# Patient Record
Sex: FEMALE | Race: WHITE | Employment: PART TIME | URBAN - METROPOLITAN AREA
[De-identification: names, ages, dates, MRNs, and addresses within clinical notes are randomized per-mention and may not be internally consistent; named-entity substitution may affect disease eponyms.]

---

## 2018-08-22 LAB
C. TRACHOMATIS, EXTERNAL RESULT: NEGATIVE
N. GONORRHOEAE, EXTERNAL RESULT: NEGATIVE

## 2018-08-31 LAB
ABO, EXTERNAL RESULT: NORMAL
HEP B, EXTERNAL RESULT: NEGATIVE
HIV, EXTERNAL RESULT: NEGATIVE
RHOGAM, EXTERNAL RESULT: POSITIVE
RPR, EXTERNAL RESULT: NONREACTIVE
RUBELLA TITER, EXTERNAL RESULT: NORMAL

## 2018-11-21 ENCOUNTER — HOSPITAL ENCOUNTER (EMERGENCY)
Age: 33
Discharge: HOME | End: 2018-11-21
Payer: MEDICAID

## 2018-11-21 VITALS
TEMPERATURE: 98.2 F | BODY MASS INDEX: 26.4 KG/M2 | HEIGHT: 63 IN | SYSTOLIC BLOOD PRESSURE: 111 MMHG | DIASTOLIC BLOOD PRESSURE: 67 MMHG | RESPIRATION RATE: 16 BRPM | HEART RATE: 80 BPM | WEIGHT: 149 LBS | OXYGEN SATURATION: 100 %

## 2018-11-21 DIAGNOSIS — R05.9 COUGH: Primary | ICD-10-CM

## 2018-11-21 DIAGNOSIS — Z3A.22 22 WEEKS GESTATION OF PREGNANCY: ICD-10-CM

## 2018-11-21 DIAGNOSIS — F17.210 CIGARETTE SMOKER: ICD-10-CM

## 2018-11-21 PROCEDURE — 99283 EMERGENCY DEPT VISIT LOW MDM: CPT

## 2018-11-21 RX ORDER — AZITHROMYCIN 250 MG/1
250 TABLET, FILM COATED ORAL DAILY
Qty: 6 TABLET | Refills: 0 | Status: SHIPPED | OUTPATIENT
Start: 2018-11-21 | End: 2018-11-26

## 2019-01-14 ENCOUNTER — HOSPITAL ENCOUNTER (OUTPATIENT)
Dept: DIABETES SERVICES | Age: 34
Setting detail: THERAPIES SERIES
Discharge: HOME | End: 2019-01-14
Payer: MEDICAID

## 2019-01-14 VITALS — WEIGHT: 160 LBS | BODY MASS INDEX: 28.34 KG/M2

## 2019-01-14 DIAGNOSIS — O24.410 DIET CONTROLLED GESTATIONAL DIABETES MELLITUS (GDM) IN THIRD TRIMESTER: Primary | ICD-10-CM

## 2019-01-14 PROCEDURE — G0109 DIAB MANAGE TRN IND/GROUP: HCPCS | Performed by: DIETITIAN, REGISTERED

## 2019-01-14 ASSESSMENT — PATIENT HEALTH QUESTIONNAIRE - PHQ9
SUM OF ALL RESPONSES TO PHQ QUESTIONS 1-9: 2
SUM OF ALL RESPONSES TO PHQ QUESTIONS 1-9: 2

## 2019-01-31 ENCOUNTER — HOSPITAL ENCOUNTER (OUTPATIENT)
Dept: DIABETES SERVICES | Age: 34
Setting detail: THERAPIES SERIES
Discharge: HOME | End: 2019-01-31
Payer: MEDICAID

## 2019-01-31 DIAGNOSIS — O24.410 DIET CONTROLLED GESTATIONAL DIABETES MELLITUS (GDM) IN THIRD TRIMESTER: Primary | ICD-10-CM

## 2019-03-08 LAB — GBS, EXTERNAL RESULT: NEGATIVE

## 2019-04-05 ENCOUNTER — HOSPITAL ENCOUNTER (INPATIENT)
Age: 34
LOS: 4 days | Discharge: HOME | DRG: 540 | End: 2019-04-09
Attending: OBSTETRICS & GYNECOLOGY | Admitting: OBSTETRICS & GYNECOLOGY
Payer: MEDICAID

## 2019-04-05 ENCOUNTER — APPOINTMENT (OUTPATIENT)
Dept: LABOR AND DELIVERY | Age: 34
DRG: 540 | End: 2019-04-05
Payer: MEDICAID

## 2019-04-05 LAB
ABO/RH: NORMAL
AMPHETAMINE SCREEN, URINE: NORMAL
ANTIBODY SCREEN: NORMAL
BARBITURATE SCREEN URINE: NORMAL
BASOPHILS ABSOLUTE: 0 K/UL (ref 0–0.2)
BASOPHILS RELATIVE PERCENT: 0.2 %
BENZODIAZEPINE SCREEN, URINE: NORMAL
BUPRENORPHINE URINE: NORMAL
CANNABINOID SCREEN URINE: NORMAL
COCAINE METABOLITE SCREEN URINE: NORMAL
EOSINOPHILS ABSOLUTE: 0.2 K/UL (ref 0–0.6)
EOSINOPHILS RELATIVE PERCENT: 1.8 %
GLUCOSE BLD-MCNC: 83 MG/DL (ref 70–99)
HCT VFR BLD CALC: 35.1 % (ref 36–48)
HEMOGLOBIN: 12.2 G/DL (ref 12–16)
LYMPHOCYTES ABSOLUTE: 1.1 K/UL (ref 1–5.1)
LYMPHOCYTES RELATIVE PERCENT: 12.3 %
Lab: NORMAL
MCH RBC QN AUTO: 32.8 PG (ref 26–34)
MCHC RBC AUTO-ENTMCNC: 34.7 G/DL (ref 31–36)
MCV RBC AUTO: 94.6 FL (ref 80–100)
METHADONE SCREEN, URINE: NORMAL
MONOCYTES ABSOLUTE: 0.6 K/UL (ref 0–1.3)
MONOCYTES RELATIVE PERCENT: 6.4 %
NEUTROPHILS ABSOLUTE: 6.8 K/UL (ref 1.7–7.7)
NEUTROPHILS RELATIVE PERCENT: 79.3 %
OPIATE SCREEN URINE: NORMAL
OXYCODONE URINE: NORMAL
PDW BLD-RTO: 13.1 % (ref 12.4–15.4)
PERFORMED ON: NORMAL
PH UA: 6
PHENCYCLIDINE SCREEN URINE: NORMAL
PLATELET # BLD: 209 K/UL (ref 135–450)
PMV BLD AUTO: 8.9 FL (ref 5–10.5)
PROPOXYPHENE SCREEN: NORMAL
RBC # BLD: 3.71 M/UL (ref 4–5.2)
TOTAL SYPHILLIS IGG/IGM: NORMAL
WBC # BLD: 8.6 K/UL (ref 4–11)

## 2019-04-05 PROCEDURE — 6360000002 HC RX W HCPCS: Performed by: OBSTETRICS & GYNECOLOGY

## 2019-04-05 PROCEDURE — 1220000000 HC SEMI PRIVATE OB R&B

## 2019-04-05 PROCEDURE — 86850 RBC ANTIBODY SCREEN: CPT

## 2019-04-05 PROCEDURE — 86901 BLOOD TYPING SEROLOGIC RH(D): CPT

## 2019-04-05 PROCEDURE — 80307 DRUG TEST PRSMV CHEM ANLYZR: CPT

## 2019-04-05 PROCEDURE — 6370000000 HC RX 637 (ALT 250 FOR IP): Performed by: OBSTETRICS & GYNECOLOGY

## 2019-04-05 PROCEDURE — 86900 BLOOD TYPING SEROLOGIC ABO: CPT

## 2019-04-05 PROCEDURE — 86780 TREPONEMA PALLIDUM: CPT

## 2019-04-05 PROCEDURE — 85025 COMPLETE CBC W/AUTO DIFF WBC: CPT

## 2019-04-05 PROCEDURE — 2580000003 HC RX 258: Performed by: OBSTETRICS & GYNECOLOGY

## 2019-04-05 RX ORDER — ONDANSETRON 2 MG/ML
4 INJECTION INTRAMUSCULAR; INTRAVENOUS EVERY 6 HOURS PRN
Status: DISCONTINUED | OUTPATIENT
Start: 2019-04-05 | End: 2019-04-07

## 2019-04-05 RX ORDER — SODIUM CHLORIDE 0.9 % (FLUSH) 0.9 %
10 SYRINGE (ML) INJECTION EVERY 12 HOURS SCHEDULED
Status: DISCONTINUED | OUTPATIENT
Start: 2019-04-05 | End: 2019-04-07

## 2019-04-05 RX ORDER — ACYCLOVIR 800 MG/1
800 TABLET ORAL 2 TIMES DAILY
Status: ON HOLD | COMMUNITY
End: 2019-04-09 | Stop reason: HOSPADM

## 2019-04-05 RX ORDER — SODIUM CHLORIDE 0.9 % (FLUSH) 0.9 %
10 SYRINGE (ML) INJECTION PRN
Status: DISCONTINUED | OUTPATIENT
Start: 2019-04-05 | End: 2019-04-07

## 2019-04-05 RX ORDER — SODIUM CHLORIDE, SODIUM LACTATE, POTASSIUM CHLORIDE, CALCIUM CHLORIDE 600; 310; 30; 20 MG/100ML; MG/100ML; MG/100ML; MG/100ML
INJECTION, SOLUTION INTRAVENOUS CONTINUOUS
Status: DISCONTINUED | OUTPATIENT
Start: 2019-04-05 | End: 2019-04-07

## 2019-04-05 RX ADMIN — DINOPROSTONE 10 MG: 10 INSERT VAGINAL at 09:10

## 2019-04-05 RX ADMIN — Medication 1 MILLI-UNITS/MIN: at 23:10

## 2019-04-05 RX ADMIN — SODIUM CHLORIDE, POTASSIUM CHLORIDE, SODIUM LACTATE AND CALCIUM CHLORIDE: 600; 310; 30; 20 INJECTION, SOLUTION INTRAVENOUS at 08:00

## 2019-04-05 RX ADMIN — SODIUM CHLORIDE, POTASSIUM CHLORIDE, SODIUM LACTATE AND CALCIUM CHLORIDE: 600; 310; 30; 20 INJECTION, SOLUTION INTRAVENOUS at 22:15

## 2019-04-05 ASSESSMENT — PAIN - FUNCTIONAL ASSESSMENT: PAIN_FUNCTIONAL_ASSESSMENT: 0-10

## 2019-04-05 NOTE — H&P
Department of Obstetrics and Gynecology   Obstetrics History and Physical        CHIEF COMPLAINT: IOL     HISTORY OF PRESENT ILLNESS:      The patient is a 29 y.o. female at 36w2d. OB History        3    Para   0    Term   0            AB   2    Living           SAB        TAB        Ectopic        Molar        Multiple        Live Births                Patient presents with a chief complaint as above and is being admitted for IOL 2/ to gDM- diet     Blood sugars controlled with diet. QS neg this pregnancy   Hx HSV on suppression- no current outbreak/sx   GBS neg   1 hr - abn 3 hr      Estimated Due Date: Estimated Date of Delivery: 19    PAST OB HISTORY:  OB History        3    Para   0    Term   0            AB   2    Living           SAB        TAB        Ectopic        Molar        Multiple        Live Births                    Past Medical History:        Diagnosis Date    Chicken pox     Diabetes mellitus ([facility] [place] [address]     Herpes simplex virus (HSV) infection     Pneumonia, aspiration ([facility] [place] [address]     used an inhalant and aspirated it    Tobacco use disorder 2012     Past Surgical History:        Procedure Laterality Date    DILATION AND CURETTAGE OF UTERUS       Allergies:  Patient has no known allergies.     Social History:    Social History     Socioeconomic History    Marital status: Single     Spouse name: Not on file    Number of children: Not on file    Years of education: Not on file    Highest education level: Not on file   Occupational History    Not on file   Social Needs    Financial resource strain: Not on file    Food insecurity:     Worry: Not on file     Inability: Not on file    Transportation needs:     Medical: Not on file     Non-medical: Not on file   Tobacco Use    Smoking status: Current Every Day Smoker     Packs/day: 0.50     Years: 0.00     Pack years: 0.00     Last attempt to quit: 2011     Years since quittin.4    Smokeless tobacco: Never Used    Tobacco comment: maybe 3 cigs per week   Substance and Sexual Activity    Alcohol use: Yes     Comment: 1 glass red wine per week    Drug use: Not Currently     Types: Marijuana     Comment: hx of marijuana use during pregnancy    Sexual activity: Yes     Partners: Male   Lifestyle    Physical activity:     Days per week: Not on file     Minutes per session: Not on file    Stress: Not on file   Relationships    Social connections:     Talks on phone: Not on file     Gets together: Not on file     Attends Congregational service: Not on file     Active member of club or organization: Not on file     Attends meetings of clubs or organizations: Not on file     Relationship status: Not on file    Intimate partner violence:     Fear of current or ex partner: Not on file     Emotionally abused: Not on file     Physically abused: Not on file     Forced sexual activity: Not on file   Other Topics Concern    Not on file   Social History Narrative    Not on file     Family History:       Problem Relation Age of Onset    Mental Illness Father     Substance Abuse Father     Diabetes Other      Medications Prior to Admission:  Medications Prior to Admission: acyclovir (ZOVIRAX) 800 MG tablet, Take 800 mg by mouth 2 times daily  Prenatal MV-Min-Fe Fum-FA-DHA (PRENATAL 1 PO), Take by mouth      PHYSICAL EXAM:  Vitals:    04/05/19 0757   BP: 131/78   Pulse: 107   Resp: 16   Temp: 98.3 °F (36.8 °C)   TempSrc: Oral     General appearance:  awake, alert, cooperative, no apparent distress, and appears stated age  Neurologic:  Awake, alert, oriented to name, place and time. Lungs:  No increased work of breathing, good air exchange  Abdomen:  Soft, non tender, gravid, consistent with her gestational age, EFW by Leopald's manouever was 8#  Fetal heart rate:  Reassuring.   Pelvis:  Adequate pelvis  Cervix: 1-2/60/-2 , can stretch to 2cm  Contraction frequency:  none      Labs:   CBC:   Lab Results Component Value Date    WBC 8.6 04/05/2019    RBC 3.71 04/05/2019    HGB 12.2 04/05/2019    HCT 35.1 04/05/2019    MCV 94.6 04/05/2019    MCH 32.8 04/05/2019    MCHC 34.7 04/05/2019    RDW 13.1 04/05/2019     04/05/2019    MPV 8.9 04/05/2019       ASSESSMENT AND PLAN:    Labor: Admit, anticipate normal delivery, routine labor orders  Fetus: Reassuring  GBS: No  Other: discussed IOL, discussed cervical exam and cervical ripening, will proceed with cervidil.  Check blood sugar on admission    Damien Rosales MD

## 2019-04-05 NOTE — PROGRESS NOTES
Structured report received. Pt awake, alert and oriented x4 resting with semi-fowlers with pillow tilt. Pt states positive FM. Denies HA, blurred vision and epigastric pain. Denies vaginal bleeding or LOF. States intermittent ctx pain referring to ctxs as \"mild period cramping\" but denies pain. Pt reports no pain associated with IV at this time. Updated on POC. Verbalized understanding. All questions answered at this time. Will continue to monitor and assess.

## 2019-04-06 ENCOUNTER — ANESTHESIA (OUTPATIENT)
Dept: LABOR AND DELIVERY | Age: 34
DRG: 540 | End: 2019-04-06
Payer: MEDICAID

## 2019-04-06 ENCOUNTER — ANESTHESIA EVENT (OUTPATIENT)
Dept: LABOR AND DELIVERY | Age: 34
DRG: 540 | End: 2019-04-06
Payer: MEDICAID

## 2019-04-06 VITALS — SYSTOLIC BLOOD PRESSURE: 70 MMHG | DIASTOLIC BLOOD PRESSURE: 42 MMHG | OXYGEN SATURATION: 94 %

## 2019-04-06 PROCEDURE — 7100000001 HC PACU RECOVERY - ADDTL 15 MIN: Performed by: OBSTETRICS & GYNECOLOGY

## 2019-04-06 PROCEDURE — 3700000025 EPIDURAL BLOCK: Performed by: ANESTHESIOLOGY

## 2019-04-06 PROCEDURE — 3700000000 HC ANESTHESIA ATTENDED CARE: Performed by: OBSTETRICS & GYNECOLOGY

## 2019-04-06 PROCEDURE — 2709999900 HC NON-CHARGEABLE SUPPLY: Performed by: OBSTETRICS & GYNECOLOGY

## 2019-04-06 PROCEDURE — 2500000003 HC RX 250 WO HCPCS: Performed by: NURSE ANESTHETIST, CERTIFIED REGISTERED

## 2019-04-06 PROCEDURE — 1220000000 HC SEMI PRIVATE OB R&B

## 2019-04-06 PROCEDURE — 6360000002 HC RX W HCPCS: Performed by: NURSE ANESTHETIST, CERTIFIED REGISTERED

## 2019-04-06 PROCEDURE — 51701 INSERT BLADDER CATHETER: CPT

## 2019-04-06 PROCEDURE — 7100000000 HC PACU RECOVERY - FIRST 15 MIN: Performed by: OBSTETRICS & GYNECOLOGY

## 2019-04-06 PROCEDURE — 3609079900 HC CESAREAN SECTION: Performed by: OBSTETRICS & GYNECOLOGY

## 2019-04-06 PROCEDURE — 2580000003 HC RX 258: Performed by: OBSTETRICS & GYNECOLOGY

## 2019-04-06 PROCEDURE — 3700000001 HC ADD 15 MINUTES (ANESTHESIA): Performed by: OBSTETRICS & GYNECOLOGY

## 2019-04-06 PROCEDURE — 51702 INSERT TEMP BLADDER CATH: CPT

## 2019-04-06 PROCEDURE — 6360000002 HC RX W HCPCS: Performed by: OBSTETRICS & GYNECOLOGY

## 2019-04-06 RX ORDER — MORPHINE SULFATE 0.5 MG/ML
INJECTION, SOLUTION EPIDURAL; INTRATHECAL; INTRAVENOUS
Status: DISCONTINUED
Start: 2019-04-06 | End: 2019-04-07

## 2019-04-06 RX ORDER — FENTANYL CITRATE 50 UG/ML
INJECTION, SOLUTION INTRAMUSCULAR; INTRAVENOUS PRN
Status: DISCONTINUED | OUTPATIENT
Start: 2019-04-06 | End: 2019-04-07 | Stop reason: SDUPTHER

## 2019-04-06 RX ORDER — BUPIVACAINE HYDROCHLORIDE 5 MG/ML
INJECTION, SOLUTION EPIDURAL; INTRACAUDAL PRN
Status: DISCONTINUED | OUTPATIENT
Start: 2019-04-06 | End: 2019-04-07 | Stop reason: SDUPTHER

## 2019-04-06 RX ORDER — LIDOCAINE HYDROCHLORIDE 20 MG/ML
INJECTION, SOLUTION EPIDURAL; INFILTRATION; INTRACAUDAL; PERINEURAL PRN
Status: DISCONTINUED | OUTPATIENT
Start: 2019-04-06 | End: 2019-04-07 | Stop reason: SDUPTHER

## 2019-04-06 RX ORDER — ONDANSETRON 2 MG/ML
INJECTION INTRAMUSCULAR; INTRAVENOUS
Status: COMPLETED
Start: 2019-04-06 | End: 2019-04-06

## 2019-04-06 RX ORDER — MORPHINE SULFATE 10 MG/ML
INJECTION, SOLUTION INTRAMUSCULAR; INTRAVENOUS PRN
Status: DISCONTINUED | OUTPATIENT
Start: 2019-04-06 | End: 2019-04-07 | Stop reason: SDUPTHER

## 2019-04-06 RX ORDER — MIDAZOLAM HYDROCHLORIDE 1 MG/ML
INJECTION INTRAMUSCULAR; INTRAVENOUS PRN
Status: DISCONTINUED | OUTPATIENT
Start: 2019-04-06 | End: 2019-04-07 | Stop reason: SDUPTHER

## 2019-04-06 RX ORDER — ONDANSETRON 2 MG/ML
INJECTION INTRAMUSCULAR; INTRAVENOUS PRN
Status: DISCONTINUED | OUTPATIENT
Start: 2019-04-06 | End: 2019-04-07 | Stop reason: SDUPTHER

## 2019-04-06 RX ADMIN — SODIUM CHLORIDE, POTASSIUM CHLORIDE, SODIUM LACTATE AND CALCIUM CHLORIDE: 600; 310; 30; 20 INJECTION, SOLUTION INTRAVENOUS at 06:45

## 2019-04-06 RX ADMIN — PHENYLEPHRINE HYDROCHLORIDE 100 MCG: 10 INJECTION INTRAVENOUS at 23:01

## 2019-04-06 RX ADMIN — LIDOCAINE HYDROCHLORIDE 5 ML: 20 INJECTION, SOLUTION EPIDURAL; INFILTRATION; INTRACAUDAL; PERINEURAL at 22:19

## 2019-04-06 RX ADMIN — AZITHROMYCIN MONOHYDRATE 500 MG: 500 INJECTION, POWDER, LYOPHILIZED, FOR SOLUTION INTRAVENOUS at 22:30

## 2019-04-06 RX ADMIN — BUPIVACAINE HYDROCHLORIDE 0.8 ML: 5 INJECTION, SOLUTION EPIDURAL; INTRACAUDAL; PERINEURAL at 02:08

## 2019-04-06 RX ADMIN — PHENYLEPHRINE HYDROCHLORIDE 200 MCG: 10 INJECTION INTRAVENOUS at 23:12

## 2019-04-06 RX ADMIN — FAMOTIDINE 20 MG: 10 INJECTION, SOLUTION INTRAVENOUS at 22:19

## 2019-04-06 RX ADMIN — LIDOCAINE HYDROCHLORIDE 5 ML: 20 INJECTION, SOLUTION EPIDURAL; INFILTRATION; INTRACAUDAL; PERINEURAL at 22:21

## 2019-04-06 RX ADMIN — Medication 15 ML/HR: at 02:17

## 2019-04-06 RX ADMIN — ONDANSETRON 4 MG: 2 INJECTION INTRAMUSCULAR; INTRAVENOUS at 22:18

## 2019-04-06 RX ADMIN — MORPHINE SULFATE 3 MG: 10 INJECTION, SOLUTION INTRAMUSCULAR; INTRAVENOUS at 22:50

## 2019-04-06 RX ADMIN — PHENYLEPHRINE HYDROCHLORIDE 100 MCG: 10 INJECTION INTRAVENOUS at 23:06

## 2019-04-06 RX ADMIN — MIDAZOLAM HYDROCHLORIDE 2 MG: 2 INJECTION, SOLUTION INTRAMUSCULAR; INTRAVENOUS at 22:45

## 2019-04-06 RX ADMIN — LIDOCAINE HYDROCHLORIDE 5 ML: 20 INJECTION, SOLUTION EPIDURAL; INFILTRATION; INTRACAUDAL; PERINEURAL at 22:17

## 2019-04-06 RX ADMIN — PHENYLEPHRINE HYDROCHLORIDE 100 MCG: 10 INJECTION INTRAVENOUS at 22:58

## 2019-04-06 RX ADMIN — MORPHINE SULFATE 2 MG: 10 INJECTION, SOLUTION INTRAMUSCULAR; INTRAVENOUS at 23:10

## 2019-04-06 RX ADMIN — FENTANYL CITRATE 100 MCG: 50 INJECTION INTRAMUSCULAR; INTRAVENOUS at 22:44

## 2019-04-06 RX ADMIN — SODIUM CHLORIDE, POTASSIUM CHLORIDE, SODIUM LACTATE AND CALCIUM CHLORIDE: 600; 310; 30; 20 INJECTION, SOLUTION INTRAVENOUS at 15:19

## 2019-04-06 RX ADMIN — LIDOCAINE HYDROCHLORIDE 5 ML: 20 INJECTION, SOLUTION EPIDURAL; INFILTRATION; INTRACAUDAL; PERINEURAL at 22:27

## 2019-04-06 RX ADMIN — FENTANYL CITRATE 100 MCG: 50 INJECTION INTRAMUSCULAR; INTRAVENOUS at 22:47

## 2019-04-06 RX ADMIN — Medication 2 G: at 22:28

## 2019-04-06 ASSESSMENT — PULMONARY FUNCTION TESTS
PIF_VALUE: 0

## 2019-04-06 ASSESSMENT — LIFESTYLE VARIABLES: SMOKING_STATUS: 1

## 2019-04-06 NOTE — PLAN OF CARE
Problem: Anxiety:  Goal: Level of anxiety will decrease  Description  Level of anxiety will decrease  Outcome: Ongoing     Problem: Breathing Pattern - Ineffective:  Goal: Able to breathe comfortably  Description  Able to breathe comfortably  Outcome: Ongoing     Problem: Fluid Volume - Imbalance:  Goal: Absence of imbalanced fluid volume signs and symptoms  Description  Absence of imbalanced fluid volume signs and symptoms  Outcome: Ongoing  Goal: Absence of intrapartum hemorrhage signs and symptoms  Description  Absence of intrapartum hemorrhage signs and symptoms  Outcome: Ongoing     Problem: Infection - Intrapartum Infection:  Goal: Will show no infection signs and symptoms  Description  Will show no infection signs and symptoms  Outcome: Ongoing     Problem: Labor Process - Prolonged:  Goal: Labor progression, first stage, within specified pattern  Description  Labor progression, first stage, within specified pattern  Outcome: Ongoing  Goal: Labor progession, second stage, within specified pattern  Description  Labor progession, second stage, within specified pattern  Outcome: Ongoing  Goal: Uterine contractions within specified parameters  Description  Uterine contractions within specified parameters  Outcome: Ongoing     Problem: Pain - Acute:  Goal: Pain level will decrease  Description  Pain level will decrease  Outcome: Ongoing  Goal: Able to cope with pain  Description  Able to cope with pain  Outcome: Ongoing     Problem: Tissue Perfusion - Uteroplacental, Altered:  Goal: Absence of abnormal fetal heart rate pattern  Description  Absence of abnormal fetal heart rate pattern  Outcome: Ongoing     Problem: Urinary Retention:  Goal: Experiences of bladder distention will decrease  Description  Experiences of bladder distention will decrease  Outcome: Ongoing  Goal: Urinary elimination within specified parameters  Description  Urinary elimination within specified parameters  Outcome: Ongoing     Problem: Pain:  Goal: Pain level will decrease  Description  Pain level will decrease  Outcome: Ongoing  Goal: Control of acute pain  Description  Control of acute pain  Outcome: Ongoing  Goal: Control of chronic pain  Description  Control of chronic pain  Outcome: Ongoing

## 2019-04-06 NOTE — ANESTHESIA PRE PROCEDURE
Department of Anesthesiology  Preprocedure Note       Name:  Alicia Lira   Age:  29 y.o.  :  1985                                          MRN:  [id number]         Date:  2019      Surgeon: * No surgeons listed *    Procedure: ANESTHESIA LABOR ANALGESIA    Medications prior to admission:   Prior to Admission medications    Medication Sig Start Date End Date Taking?  Authorizing Provider   acyclovir (ZOVIRAX) 800 MG tablet Take 800 mg by mouth 2 times daily   Yes Historical Provider, MD   Prenatal MV-Min-Fe Fum-FA-DHA (PRENATAL 1 PO) Take by mouth   Yes Historical Provider, MD       Current medications:    Current Facility-Administered Medications   Medication Dose Route Frequency Provider Last Rate Last Dose    sodium chloride 0.9 % 200 mL with fentaNYL 500 mcg, bupivacaine 0.5% 50 mL (OB) epidural             lactated ringers infusion   Intravenous Continuous Arpita Brantley  mL/hr at 19      sodium chloride flush 0.9 % injection 10 mL  10 mL Intravenous 2 times per day Arpita Brantley MD        sodium chloride flush 0.9 % injection 10 mL  10 mL Intravenous PRN Arpita Brantley MD        ondansetron [facility]) injection 4 mg  4 mg Intravenous Q6H PRN Arpita Brantley MD        oxytocin (PITOCIN) 30 units in 500 mL infusion  1 milo-units/min Intravenous Continuous Arpita Brantley MD 6 mL/hr at 19 0100 6 milo-units/min at 19 0100       Allergies:  No Known Allergies    Problem List:    Patient Active Problem List   Diagnosis Code    Tobacco use disorder F17.200       Past Medical History:        Diagnosis Date    Chicken pox     [facility] ([facility] [address]     Herpes simplex virus (HSV) infection     Pneumonia, aspiration ([facility] [address]     used an inhalant and aspirated it    Tobacco use disorder 2012       Past Surgical History:        Procedure Laterality Date    DILATION AND CURETTAGE OF UTERUS         Social History:    Social History     Tobacco Use    Smoking status: Current Every Day Smoker     Packs/day: 0.50     Years: 0.00     Pack years: 0.00     Last attempt to quit: 2011     Years since quittin.4    Smokeless tobacco: Never Used    Tobacco comment: maybe 3 cigs per week   Substance Use Topics    Alcohol use: Yes     Comment: 1 glass red wine per week                                Ready to quit: Yes  Counseling given: No  Comment: maybe 3 cigs per week      Vital Signs (Current):   Vitals:    19 0000 19 0030 19 0100 19 0138   BP: 138/74 117/64 (!) 109/57 (!) 145/74   Pulse: 77 83 77 74   Resp: 16 16 16    Temp: 37 °C (98.6 °F)      TempSrc: Oral      Weight:       Height:                                                  BP Readings from Last 3 Encounters:   19 (!) 145/74   18 111/67   12 108/60       NPO Status: Time of last liquid consumption: 730                        Time of last solid consumption: 730                        Date of last liquid consumption: 19                        Date of last solid food consumption: 19    BMI:   Wt Readings from Last 3 Encounters:   19 177 lb (80.3 kg)   19 160 lb (72.6 kg)   18 149 lb (67.6 kg)     Body mass index is 31.35 kg/m².     CBC:   Lab Results   Component Value Date    WBC 8.6 2019    RBC 3.71 2019    HGB 12.2 2019    HCT 35.1 2019    MCV 94.6 2019    RDW 13.1 2019     2019       CMP: No results found for: NA, K, CL, CO2, BUN, CREATININE, GFRAA, AGRATIO, LABGLOM, GLUCOSE, PROT, CALCIUM, BILITOT, ALKPHOS, AST, ALT    POC Tests:   Recent Labs     19  0849   POCGLU 83       Coags: No results found for: PROTIME, INR, APTT    HCG (If Applicable): No results found for: PREGTESTUR, PREGSERUM, HCG, HCGQUANT     ABGs: No results found for: PHART, PO2ART, BUN6RIH, JAZ0OPS, BEART, L6FBVNMN     Type & Screen (If Applicable):  No results found for: LABABO, [address]    Anesthesia Evaluation  Patient summary reviewed and Nursing notes reviewed no history of anesthetic complications:   Airway: Mallampati: II        Dental:          Pulmonary:   (+) current smoker                           Cardiovascular:Negative CV ROS                      Neuro/Psych:   Negative Neuro/Psych ROS              GI/Hepatic/Renal: Neg GI/Hepatic/Renal ROS            Endo/Other:    (+) Diabetes (gest), . Abdominal:           Vascular: negative vascular ROS. Anesthesia Plan      epidural     ASA 2     (Benefits and risks of CSE were discussed and agreed upon. All questions were answered, and verbal consent was obtained.)        Anesthetic plan and risks discussed with patient.                       ISHAN Hernandez - CRNA   4/6/2019

## 2019-04-06 NOTE — PROGRESS NOTES
Dr. Jeff Ortega in department. Updated on pt status. Epidural received approximately 0200.  0502 pitocin halfed r/t late FHR decelerations present on monitor. Pt repositioned, IVF fluid bolus given. SVE results at 0600 5/70/-2 s/p straight catheterization. Pitocin at 5 mu/min at this time. Ctxs occurring q2-4min and pt resting comfortably on far left side. No orders given at this time. Will continue to monitor and assess.

## 2019-04-06 NOTE — PROGRESS NOTES
Dr. Brian To at bedside. FHT/ctx pattern reviewed. Plan of care discussed. Will continue to monitor.

## 2019-04-06 NOTE — PLAN OF CARE
Problem: Anxiety:  Goal: Level of anxiety will decrease  Description  Level of anxiety will decrease  4/6/2019 0941 by Gauri Amaya RN  Outcome: Ongoing  4/6/2019 0714 by Suzy Villanueva RN  Outcome: Ongoing     Problem: Breathing Pattern - Ineffective:  Goal: Able to breathe comfortably  Description  Able to breathe comfortably  4/6/2019 0941 by Gauri Amaya RN  Outcome: Ongoing  4/6/2019 0714 by Suzy Villanueva RN  Outcome: Ongoing     Problem: Fluid Volume - Imbalance:  Goal: Absence of imbalanced fluid volume signs and symptoms  Description  Absence of imbalanced fluid volume signs and symptoms  4/6/2019 0941 by Gauri Amaya RN  Outcome: Ongoing  4/6/2019 0714 by Suzy Villanueva RN  Outcome: Ongoing  Goal: Absence of intrapartum hemorrhage signs and symptoms  Description  Absence of intrapartum hemorrhage signs and symptoms  4/6/2019 0941 by Gauri Amaya RN  Outcome: Ongoing  4/6/2019 0714 by Suzy Villanueva RN  Outcome: Ongoing     Problem: Infection - Intrapartum Infection:  Goal: Will show no infection signs and symptoms  Description  Will show no infection signs and symptoms  4/6/2019 0941 by Gauri Amaya RN  Outcome: Ongoing  4/6/2019 0714 by Suzy Villanueva RN  Outcome: Ongoing     Problem: Labor Process - Prolonged:  Goal: Labor progression, first stage, within specified pattern  Description  Labor progression, first stage, within specified pattern  4/6/2019 0941 by Gauri Amaya RN  Outcome: Ongoing  4/6/2019 0714 by Suzy Villanueva RN  Outcome: Ongoing  Goal: Labor progession, second stage, within specified pattern  Description  Labor progession, second stage, within specified pattern  4/6/2019 0714 by Suzy Villanueva RN  Outcome: Ongoing  Goal: Uterine contractions within specified parameters  Description  Uterine contractions within specified parameters  4/6/2019 0714 by Suzy Villanueva RN  Outcome: Ongoing     Problem: Pain - Acute:  Goal: Pain level will decrease  Description  Pain level will decrease  4/6/2019 0941 by Jose Sheridan RN  Outcome: Ongoing  4/6/2019 0714 by Anette Calhoun RN  Outcome: Ongoing  Goal: Able to cope with pain  Description  Able to cope with pain  4/6/2019 0714 by Anette Calhoun RN  Outcome: Ongoing     Problem: Tissue Perfusion - Uteroplacental, Altered:  Goal: Absence of abnormal fetal heart rate pattern  Description  Absence of abnormal fetal heart rate pattern  4/6/2019 0941 by Jose Sheridan RN  Outcome: Ongoing  4/6/2019 0714 by Anette Calhoun RN  Outcome: Ongoing     Problem: Urinary Retention:  Goal: Experiences of bladder distention will decrease  Description  Experiences of bladder distention will decrease  4/6/2019 0714 by Anette Calhoun RN  Outcome: Ongoing  Goal: Urinary elimination within specified parameters  Description  Urinary elimination within specified parameters  4/6/2019 0941 by Jose Sheridan RN  Outcome: Ongoing  4/6/2019 0714 by Anette Calhoun RN  Outcome: Ongoing     Problem: Pain:  Goal: Pain level will decrease  Description  Pain level will decrease  4/6/2019 0941 by Jose Sheridan RN  Outcome: Ongoing  4/6/2019 0714 by Anette Calhoun RN  Outcome: Ongoing  Goal: Control of acute pain  Description  Control of acute pain  4/6/2019 0714 by Anette Calhoun RN  Outcome: Ongoing  Goal: Control of chronic pain  Description  Control of chronic pain  4/6/2019 0714 by Anette Calhoun RN  Outcome: Ongoing

## 2019-04-06 NOTE — PROGRESS NOTES
Dr. Kj Erickson at bedside. FHT/ctx pattern reviewed. SVE 10/100/-1. Will plan to labor down for 1 hour, then start pushing.

## 2019-04-06 NOTE — PROGRESS NOTES
Dr. Hung Salgado updated on pt status. SROM at 0124. Pt reporting pain level 6/10 with ctxs. SVE performed, results 2/60/-2. Pt requesting pain relief. MD stated pt may have epidural.  6 mu of pitocin/min infusing. No other orders given at this time. Will continue to monitor and assess.

## 2019-04-06 NOTE — PROGRESS NOTES
Patient actively pushing. RN remains in continuous attendance at the bedside. Assessment & evaluation of fetal heart rate and contraction pattern ongoing via continuous EFM.

## 2019-04-06 NOTE — FLOWSHEET NOTE
Dr. Jerad Allen updated on pt status, unchanged SVE since 1213, currently on 14 milliunits of Pitocin, and urinary output of less than 30ml/hr since 0700. Orders rec'd to place sorto catheter and give patient 500ml bolus of LR. MD will be out to perform SVE around 1630. Nuris Trejo and Hadley Mehta, primary RNs informed of orders.

## 2019-04-06 NOTE — ANESTHESIA PROCEDURE NOTES
Epidural Block    Patient location during procedure: OB  Start time: 4/6/2019 2:00 AM  End time: 4/6/2019 2:22 AM  Reason for block: labor epidural  Staffing  Anesthesiologist: Raymundo Jimenez MD  Resident/CRNA: ISHAN Wick - CRNA  Performed: resident/CRNA   Preanesthetic Checklist  Completed: patient identified, pre-op evaluation, timeout performed, IV checked, risks and benefits discussed, monitors and equipment checked, anesthesia consent given, oxygen available and patient being monitored  Epidural  Patient position: sitting  Prep: ChloraPrep  Patient monitoring: cardiac monitor, continuous pulse ox and frequent blood pressure checks  Approach: midline  Location: lumbar (1-5)  Injection technique: FRANKY air  Provider prep: mask and sterile gloves  Needle  Needle type: Tuohy   Needle gauge: 17 G  Needle length: 3.5 in  Needle insertion depth: 5 cm  Catheter type: side hole  Catheter size: 19 G (20 G)  Catheter at skin depth: 11 cm  Test dose: negative (3ml 2% lido with epi)  Assessment  Sensory level: T8  Hemodynamics: stable  Attempts: 1  Additional Notes  25 gauge pencil point spinal needle inserted through tuohy. Positive CSF. 0.8ml 0.5% bupivicaine injected.

## 2019-04-07 LAB
HCT VFR BLD CALC: 29.6 % (ref 36–48)
HEMOGLOBIN: 10.1 G/DL (ref 12–16)
MCH RBC QN AUTO: 33 PG (ref 26–34)
MCHC RBC AUTO-ENTMCNC: 34.2 G/DL (ref 31–36)
MCV RBC AUTO: 96.7 FL (ref 80–100)
PDW BLD-RTO: 13.1 % (ref 12.4–15.4)
PLATELET # BLD: 190 K/UL (ref 135–450)
PMV BLD AUTO: 8.7 FL (ref 5–10.5)
RBC # BLD: 3.06 M/UL (ref 4–5.2)
WBC # BLD: 25.5 K/UL (ref 4–11)

## 2019-04-07 PROCEDURE — 36415 COLL VENOUS BLD VENIPUNCTURE: CPT

## 2019-04-07 PROCEDURE — 6360000002 HC RX W HCPCS

## 2019-04-07 PROCEDURE — 6360000002 HC RX W HCPCS: Performed by: OBSTETRICS & GYNECOLOGY

## 2019-04-07 PROCEDURE — 1220000000 HC SEMI PRIVATE OB R&B

## 2019-04-07 PROCEDURE — 85027 COMPLETE CBC AUTOMATED: CPT

## 2019-04-07 PROCEDURE — 2580000003 HC RX 258: Performed by: OBSTETRICS & GYNECOLOGY

## 2019-04-07 PROCEDURE — 6370000000 HC RX 637 (ALT 250 FOR IP): Performed by: OBSTETRICS & GYNECOLOGY

## 2019-04-07 RX ORDER — SODIUM CHLORIDE 0.9 % (FLUSH) 0.9 %
10 SYRINGE (ML) INJECTION EVERY 12 HOURS SCHEDULED
Status: DISCONTINUED | OUTPATIENT
Start: 2019-04-07 | End: 2019-04-09 | Stop reason: HOSPADM

## 2019-04-07 RX ORDER — DOCUSATE SODIUM 100 MG/1
100 CAPSULE, LIQUID FILLED ORAL 2 TIMES DAILY PRN
Status: DISCONTINUED | OUTPATIENT
Start: 2019-04-07 | End: 2019-04-09 | Stop reason: HOSPADM

## 2019-04-07 RX ORDER — MORPHINE SULFATE 0.5 MG/ML
INJECTION, SOLUTION EPIDURAL; INTRATHECAL; INTRAVENOUS
Status: DISCONTINUED
Start: 2019-04-07 | End: 2019-04-07 | Stop reason: WASHOUT

## 2019-04-07 RX ORDER — SODIUM CHLORIDE 0.9 % (FLUSH) 0.9 %
10 SYRINGE (ML) INJECTION PRN
Status: DISCONTINUED | OUTPATIENT
Start: 2019-04-07 | End: 2019-04-09 | Stop reason: HOSPADM

## 2019-04-07 RX ORDER — OXYCODONE HYDROCHLORIDE AND ACETAMINOPHEN 5; 325 MG/1; MG/1
2 TABLET ORAL EVERY 4 HOURS PRN
Status: DISCONTINUED | OUTPATIENT
Start: 2019-04-07 | End: 2019-04-09 | Stop reason: HOSPADM

## 2019-04-07 RX ORDER — ONDANSETRON 2 MG/ML
INJECTION INTRAMUSCULAR; INTRAVENOUS
Status: DISCONTINUED
Start: 2019-04-07 | End: 2019-04-07 | Stop reason: WASHOUT

## 2019-04-07 RX ORDER — OXYCODONE HYDROCHLORIDE AND ACETAMINOPHEN 5; 325 MG/1; MG/1
1 TABLET ORAL EVERY 4 HOURS PRN
Status: DISCONTINUED | OUTPATIENT
Start: 2019-04-07 | End: 2019-04-09 | Stop reason: HOSPADM

## 2019-04-07 RX ORDER — SIMETHICONE 80 MG
80 TABLET,CHEWABLE ORAL EVERY 6 HOURS PRN
Status: DISCONTINUED | OUTPATIENT
Start: 2019-04-07 | End: 2019-04-09 | Stop reason: HOSPADM

## 2019-04-07 RX ORDER — KETOROLAC TROMETHAMINE 30 MG/ML
30 INJECTION, SOLUTION INTRAMUSCULAR; INTRAVENOUS EVERY 6 HOURS
Status: DISPENSED | OUTPATIENT
Start: 2019-04-07 | End: 2019-04-09

## 2019-04-07 RX ORDER — LANOLIN 100 %
OINTMENT (GRAM) TOPICAL
Status: DISCONTINUED | OUTPATIENT
Start: 2019-04-07 | End: 2019-04-09 | Stop reason: HOSPADM

## 2019-04-07 RX ORDER — ACETAMINOPHEN 325 MG/1
650 TABLET ORAL EVERY 4 HOURS PRN
Status: DISCONTINUED | OUTPATIENT
Start: 2019-04-07 | End: 2019-04-09 | Stop reason: HOSPADM

## 2019-04-07 RX ORDER — FERROUS SULFATE 325(65) MG
325 TABLET ORAL 2 TIMES DAILY WITH MEALS
Status: DISCONTINUED | OUTPATIENT
Start: 2019-04-07 | End: 2019-04-09 | Stop reason: HOSPADM

## 2019-04-07 RX ORDER — IBUPROFEN 800 MG/1
800 TABLET ORAL EVERY 6 HOURS PRN
Status: DISCONTINUED | OUTPATIENT
Start: 2019-04-07 | End: 2019-04-09 | Stop reason: HOSPADM

## 2019-04-07 RX ORDER — KETOROLAC TROMETHAMINE 30 MG/ML
INJECTION, SOLUTION INTRAMUSCULAR; INTRAVENOUS
Status: COMPLETED
Start: 2019-04-07 | End: 2019-04-07

## 2019-04-07 RX ORDER — SODIUM CHLORIDE, SODIUM LACTATE, POTASSIUM CHLORIDE, CALCIUM CHLORIDE 600; 310; 30; 20 MG/100ML; MG/100ML; MG/100ML; MG/100ML
INJECTION, SOLUTION INTRAVENOUS CONTINUOUS
Status: DISCONTINUED | OUTPATIENT
Start: 2019-04-07 | End: 2019-04-09 | Stop reason: HOSPADM

## 2019-04-07 RX ORDER — PRENATAL WITH FERROUS FUM AND FOLIC ACID 3080; 920; 120; 400; 22; 1.84; 3; 20; 10; 1; 12; 200; 27; 25; 2 [IU]/1; [IU]/1; MG/1; [IU]/1; MG/1; MG/1; MG/1; MG/1; MG/1; MG/1; UG/1; MG/1; MG/1; MG/1; MG/1
1 TABLET ORAL DAILY
Status: DISCONTINUED | OUTPATIENT
Start: 2019-04-07 | End: 2019-04-09 | Stop reason: HOSPADM

## 2019-04-07 RX ADMIN — KETOROLAC TROMETHAMINE 30 MG: 30 INJECTION, SOLUTION INTRAMUSCULAR at 01:06

## 2019-04-07 RX ADMIN — WITCH HAZEL 40 EACH: 500 SOLUTION RECTAL; TOPICAL at 11:35

## 2019-04-07 RX ADMIN — SODIUM CHLORIDE, POTASSIUM CHLORIDE, SODIUM LACTATE AND CALCIUM CHLORIDE: 600; 310; 30; 20 INJECTION, SOLUTION INTRAVENOUS at 08:01

## 2019-04-07 RX ADMIN — OXYCODONE HYDROCHLORIDE AND ACETAMINOPHEN 1 TABLET: 5; 325 TABLET ORAL at 22:54

## 2019-04-07 RX ADMIN — DOCUSATE SODIUM 100 MG: 100 CAPSULE, LIQUID FILLED ORAL at 08:01

## 2019-04-07 RX ADMIN — BENZOCAINE AND LEVOMENTHOL: 200; 5 SPRAY TOPICAL at 11:35

## 2019-04-07 RX ADMIN — OXYCODONE HYDROCHLORIDE AND ACETAMINOPHEN 1 TABLET: 5; 325 TABLET ORAL at 18:03

## 2019-04-07 RX ADMIN — IBUPROFEN 800 MG: 800 TABLET, FILM COATED ORAL at 21:13

## 2019-04-07 RX ADMIN — IBUPROFEN 800 MG: 800 TABLET, FILM COATED ORAL at 15:22

## 2019-04-07 RX ADMIN — KETOROLAC TROMETHAMINE 30 MG: 30 INJECTION, SOLUTION INTRAMUSCULAR at 08:00

## 2019-04-07 RX ADMIN — SODIUM CHLORIDE, PRESERVATIVE FREE 10 ML: 5 INJECTION INTRAVENOUS at 21:15

## 2019-04-07 RX ADMIN — SODIUM CHLORIDE, PRESERVATIVE FREE 10 ML: 5 INJECTION INTRAVENOUS at 08:00

## 2019-04-07 RX ADMIN — DOCUSATE SODIUM 100 MG: 100 CAPSULE, LIQUID FILLED ORAL at 21:13

## 2019-04-07 ASSESSMENT — PAIN SCALES - GENERAL
PAINLEVEL_OUTOF10: 7
PAINLEVEL_OUTOF10: 6
PAINLEVEL_OUTOF10: 5
PAINLEVEL_OUTOF10: 3
PAINLEVEL_OUTOF10: 7
PAINLEVEL_OUTOF10: 6

## 2019-04-07 NOTE — LACTATION NOTE
This note was copied from a baby's chart. Lactation Progress Note      Data:   RN requesting [facility] assistance with 1/0 breast feeder. Baby has had some formula supplementation for low blood sugars. Action: Assisted with good position at breast. Mom shown how to express colostrum to encourage feed. Baby rooting and a good latch was achieved with SRS and AS. BF education provided. Encouraged to allow baby to go to breast ad lavinia but at least Q 3 H. Discouraged pacifiers or bottles for the first few weeks. [facility] number on board and encouraged to call for f/u prn. Response: Verbalized and demonstrated understanding. Pleased with breast feed.

## 2019-04-07 NOTE — PROGRESS NOTES
Call placed to Dr Rik Rivas at this time. Updated on pushing times, progress, and RN desiring evaluation. MD will be in to evaluate shortly.

## 2019-04-07 NOTE — PROGRESS NOTES
This RN spoke to Dr. Carmen Solano in regards to pt low output at this time. Dr. Carmen Solano stated to bolus the pt another 500 ml/hr and continue to monitor at this time. Richard Murray RN informed of the order.

## 2019-04-07 NOTE — PROGRESS NOTES
While assessing infant this Rn noticed a bruise on his scalp and asked MOB if nursing had shown it to her. She reported no and this RN explained it is completely normal and it could have been caused several different ways during the delivery process. This RN also was educating MOB for signs of hypoglycemia due to the GDM and reasons to call nurse. Infant was jittery and since BS was recently taken and was 62 WNL, this Rn explained other reasons he could be jittery, (Nicotine and caffeine). MOB verbalized understanding. THis RN then supplemented the baby with 10ml of Sim Advance per md order and continue to give mob education on plan of care. After supplement placed infant back on mothers chest and told her if he woke up rooting at any time she could breast feed him and this RN or lactation are here to help. I left the room and stated if you need me call me. 24 mins later this RN received a call from the room and it was her mother and she stated \"there is an alarm that keeps going off and my daughter is really upset that you would tell her she abused her child\" I then stated I would be right in to discuss things. I entered the room and MOB was very tearful and felt as if I was telling her too much information and that she caused the bruise on his head and her smoking made he jittery. She also stated she hasnt had any issues with any other nurses. I asked her if she would like a different nurse and she stated \"no but I just dont want to talk much\". I apologized for the misunderstanding and tried to explain to the grandmother what occurred while she was gone. Grandmother stated that her daughter just cant take all the education and that I need to watch what I say\". I took note and reported all this to the charge nurse Diann Mathis. She went into room to clear that air and pt apologized for having a breakdown. This RN will continue to care for this patient.

## 2019-04-07 NOTE — BRIEF OP NOTE
Brief Postoperative Note  ______________________________________________________________    Patient: Luis Smith  YOB: 1985  MRN: [id number]  Date of Procedure: 2019    Pre-Op Diagnosis: Failure to descend, OP    Post-Op Diagnosis: Same       Procedure(s):   SECTION    Anesthesia: Epidural    Surgeon(s):  Seu Varghese MD    Assistant: LIZETTE Thomas    Estimated Blood Loss (mL): 940    Complications: None    Specimens:   * No specimens in log *    Implants:  * No implants in log *      Drains:   Urethral Catheter Non-latex 14 fr (Active)       [REMOVED] Urethral Catheter Non-latex 16 fr (Removed)   $ Urethral catheter insertion $ Not inserted for procedure 2019  3:30 PM   Output (mL) 15 mL 2019  6:15 PM       Findings: Normal uterus, tubes and ovaries  VMI, Apgars 8 and 9 at one and five minutes respectively  Wt: 3575 = 7 lbs 14oz, nuchal x 1, meconium stained fluid    Sue Varghese MD  Date: 2019  Time: 11:16 PM

## 2019-04-07 NOTE — FLOWSHEET NOTE
Report received on patient at this time. Name and number placed on board. Pt denies any needs at this time.

## 2019-04-07 NOTE — ANESTHESIA POSTPROCEDURE EVALUATION
Department of Anesthesiology  Postprocedure Note    Patient: Tanner Segovia  MRN: [id number]  YOB: 1985  Date of evaluation: 2019  Time:  11:14 AM     Procedure Summary     Date:  19 Room / Location:  [facility] L&D OR [address] [place] [address] L&D OR    Anesthesia Start:  0200 Anesthesia Stop:      Procedures:        SECTION (N/A Abdomen)      ANESTHESIA LABOR ANALGESIA Diagnosis:  (Failure to descend)    Surgeon:  Demarco Nieto MD Responsible Provider:  Thalia Lee MD    Anesthesia Type:  epidural ASA Status:  2          Anesthesia Type: epidural    Leah Phase I:      Leah Phase II:      Last vitals: Reviewed and per EMR flowsheets.        Anesthesia Post Evaluation    Patient location during evaluation: bedside  Patient participation: complete - patient participated  Level of consciousness: awake and alert  Airway patency: patent  Nausea & Vomiting: no nausea and no vomiting  Complications: no  Cardiovascular status: hemodynamically stable  Respiratory status: acceptable  Hydration status: stable

## 2019-04-07 NOTE — L&D DELIVERY SUMMARY NOTE
[address] [address]                            LABOR AND DELIVERY NOTE    PATIENT NAME: Arden Gray                   :        1985  MED REC NO:   [id number]                          ROOM:       0137  ACCOUNT NO:   [de-identified]                           ADMIT DATE: 2019  PROVIDER:     Salinas Vergara MD    DATE OF PROCEDURE:  2019    PREOPERATIVE DIAGNOSIS:  Failure to descend, OP position. POSTOPERATIVE DIAGNOSIS:  Failure to descend, OP position. PROCEDURE PERFORMED:  Primary low-transverse  section. ANESTHESIA:  Epidural.    SURGEON:  ELIJAH Bean MD    ASSISTANT:  Joy Leonardo    ESTIMATED BLOOD LOSS:  800 mL. COMPLICATIONS:  None. CONDITION:  Stable. FINDINGS:  Normal uterus, tubes and ovaries. Viable male infant, Apgars  8 and 9 at one and five minutes respectively. Weight was 7 pounds 14  ounces which was equivalent to 3575 gm. The placenta was intact. There  was a nuchal cord x1 and meconium-stained fluid. HISTORY OF PRESENT ILLNESS:  The patient is a 41-year-old G3, P 0-0-2-0,  that was admitted for induction of labor at 40 weeks and 4 days. She  was noted to be GDM A1. 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 She was noticed to be GBS negative  and did not receive antibiotics due to her unfavorable cervix. Cervidil  was placed; 12 hours later, it was removed and Pitocin was started. It  reached a maximum of 18 milliunits. After 2-1/2 hours of pushing, the  little descent was noted. The infant was presumed to be in OP position. A manual rotation was attempted and failed. Options of proceeding with  a primary low-transverse  section versus trying to push an  alternate position to see if the infant would rotate was discussed with  the patient.   She decided to proceed with a primary low-transverse   section. The risks and benefits including anesthesia,  infection, bleeding, and injury to other organs not limited to the bowel  and bladder were discussed with the patient. She reported understanding  and consented to proceed. DESCRIPTION OF PROCEDURE:  She was taken to the operating room and her  anesthesia was dosed to be adequate. She had a Griffith catheter. She was  prepped and draped in the normal sterile fashion. A time-out was  performed. Skin incision was made with the scalpel and carried down to  the underlying fascia. The fascia was nicked in the midline and the  incision was extended laterally with the Gonzalez scissors. The superior  aspect of the fascia was grasped up, tented up with Kocher clamps and  the rectus muscles were dissected with combination of bluntly with Gonzalez  scissors. The inferior aspect of the fascia was grasped with Kocher  clamps, tented up, and the rectus muscles were dissected off using gonzalez  scissors. The rectus muscles were  in the midline and the  peritoneum was grasped with hemostats and entered sharply with the  Metzenbaum scissors. The incision was extended superiorly and  inferiorly with good visualization of the bladder. The bladder blade  was placed and the vesicouterine peritoneum was grasped with pickups and  entered sharply with Metzenbaum scissors and extended laterally with  Metzenbaum scissors. The bladder flap was created digitally. The  bladder blade was replaced. Uterine incision was made with the scalpel  and extended laterally bluntly. Meconium-stained fluid was noted. The  infant was noted to be in the OP position. The nuchal cord x1 was  reduced. The rest of the infant was delivered atraumatically. The cord  was doubly clamped and cut and the infant was handed to the awaiting  nurse. Placenta was removed with gentle traction. The uterus was noted  to be intact.   Uterus was exteriorized and cleared off all clots and  debris. The bladder blade was replaced and the uterine incision was  reapproximated with 0 Vicryl in a running locked fashion. A second  suture of the same was used to imbricate the first.  The uterus was  returned to the abdomen. The gutters were irrigated and cleared off all  clots and debris. The uterine incision was re-visualized in situ and  noted to be hemostatic. The bladder blade was replaced and the  peritoneum was reapproximated with 2-0 Vicryl. The fascia was  reapproximated with 0 Vicryl in a running fashion. The subcutaneous  tissue was reapproximated with 3-0 Vicryl. The skin was reapproximated  with 4-0 Vicryl. The patient was taken to Recovery in stable condition.         Uriah Hawk MD    D: 04/06/2019 23:25:24       T: 04/07/2019 0:46:21     [email or web address]  Job#: [id number]     Doc#: [id number]    CC:

## 2019-04-07 NOTE — PROGRESS NOTES
Department of Obstetrics and Gynecology  Labor and Delivery  Attending Post Partum Progress Note      SUBJECTIVE:  Pt without complaints, pain controlled, tolerating po, lochia wnl, the patient is currently breastfeeding. Negative  flatus. OBJECTIVE:      Vitals:  Vitals:    19 0948   BP: (!) 101/54   Pulse: 94   Resp: 16   Temp: 98.1 °F (36.7 °C)   SpO2: 99%       RRR CTAB  ABDOMEN:  soft, non-distended, non-tender, + BS  FF below umbilicus  Incision: c/d/i with steri-strips  EXT: no edema    DATA:    CBC:    Lab Results   Component Value Date    WBC 25.5 2019    HGB 10.1 2019    HCT 29.6 2019     2019       ASSESSMENT & PLAN:    29 y.o. OB History        3    Para   1    Term   1            AB   2    Living   1       SAB        TAB        Ectopic        Molar        Multiple   0    Live Births   1             s/p C/S pod# 1  1. Doing well, continue routine post-op care. 2.  Desires male infant circumcised. Questions regarding procedure answered. 3.  Low urine output. Encouraged PO fluids. Continue to monitor closely. Addendum: Unable to determine documentation of IVF given in OR. At least 5200 cc mismatch. Reviewed with patient and family members. Will continue to monitor UOP closely. Expect to start diuresing soon. Patient had a cough prior to admission and reports cough is stable. Lungs continue to be CTA in all fields. Desires to wait until tomorrow to have male infant circumcised.

## 2019-04-08 LAB
BASOPHILS ABSOLUTE: 0.1 K/UL (ref 0–0.2)
BASOPHILS RELATIVE PERCENT: 0.3 %
EOSINOPHILS ABSOLUTE: 0.4 K/UL (ref 0–0.6)
EOSINOPHILS RELATIVE PERCENT: 1.9 %
HCT VFR BLD CALC: 24.3 % (ref 36–48)
HEMOGLOBIN: 8.2 G/DL (ref 12–16)
LYMPHOCYTES ABSOLUTE: 1.1 K/UL (ref 1–5.1)
LYMPHOCYTES RELATIVE PERCENT: 6.1 %
MCH RBC QN AUTO: 32.5 PG (ref 26–34)
MCHC RBC AUTO-ENTMCNC: 33.6 G/DL (ref 31–36)
MCV RBC AUTO: 96.8 FL (ref 80–100)
MONOCYTES ABSOLUTE: 0.7 K/UL (ref 0–1.3)
MONOCYTES RELATIVE PERCENT: 3.5 %
NEUTROPHILS ABSOLUTE: 16.4 K/UL (ref 1.7–7.7)
NEUTROPHILS RELATIVE PERCENT: 88.2 %
PDW BLD-RTO: 13 % (ref 12.4–15.4)
PLATELET # BLD: 205 K/UL (ref 135–450)
PMV BLD AUTO: 8.7 FL (ref 5–10.5)
RBC # BLD: 2.52 M/UL (ref 4–5.2)
WBC # BLD: 18.6 K/UL (ref 4–11)

## 2019-04-08 PROCEDURE — 36415 COLL VENOUS BLD VENIPUNCTURE: CPT

## 2019-04-08 PROCEDURE — 85025 COMPLETE CBC W/AUTO DIFF WBC: CPT

## 2019-04-08 PROCEDURE — 6370000000 HC RX 637 (ALT 250 FOR IP): Performed by: OBSTETRICS & GYNECOLOGY

## 2019-04-08 PROCEDURE — 1220000000 HC SEMI PRIVATE OB R&B

## 2019-04-08 PROCEDURE — 2580000003 HC RX 258: Performed by: OBSTETRICS & GYNECOLOGY

## 2019-04-08 RX ADMIN — OXYCODONE HYDROCHLORIDE AND ACETAMINOPHEN 1 TABLET: 5; 325 TABLET ORAL at 04:59

## 2019-04-08 RX ADMIN — OXYCODONE HYDROCHLORIDE AND ACETAMINOPHEN 1 TABLET: 5; 325 TABLET ORAL at 13:02

## 2019-04-08 RX ADMIN — OXYCODONE HYDROCHLORIDE AND ACETAMINOPHEN 1 TABLET: 5; 325 TABLET ORAL at 21:40

## 2019-04-08 RX ADMIN — IBUPROFEN 800 MG: 800 TABLET, FILM COATED ORAL at 04:58

## 2019-04-08 RX ADMIN — DOCUSATE SODIUM 100 MG: 100 CAPSULE, LIQUID FILLED ORAL at 09:09

## 2019-04-08 RX ADMIN — FERROUS SULFATE TAB 325 MG (65 MG ELEMENTAL FE) 325 MG: 325 (65 FE) TAB at 21:07

## 2019-04-08 RX ADMIN — DOCUSATE SODIUM 100 MG: 100 CAPSULE, LIQUID FILLED ORAL at 21:07

## 2019-04-08 RX ADMIN — IBUPROFEN 800 MG: 800 TABLET, FILM COATED ORAL at 17:41

## 2019-04-08 RX ADMIN — OXYCODONE HYDROCHLORIDE AND ACETAMINOPHEN 1 TABLET: 5; 325 TABLET ORAL at 09:09

## 2019-04-08 RX ADMIN — OXYCODONE HYDROCHLORIDE AND ACETAMINOPHEN 1 TABLET: 5; 325 TABLET ORAL at 17:41

## 2019-04-08 RX ADMIN — SIMETHICONE 80 MG: 80 TABLET, CHEWABLE ORAL at 18:21

## 2019-04-08 RX ADMIN — IBUPROFEN 800 MG: 800 TABLET, FILM COATED ORAL at 11:13

## 2019-04-08 RX ADMIN — SODIUM CHLORIDE, PRESERVATIVE FREE 10 ML: 5 INJECTION INTRAVENOUS at 09:09

## 2019-04-08 ASSESSMENT — PAIN SCALES - GENERAL
PAINLEVEL_OUTOF10: 7
PAINLEVEL_OUTOF10: 6
PAINLEVEL_OUTOF10: 8
PAINLEVEL_OUTOF10: 7
PAINLEVEL_OUTOF10: 7
PAINLEVEL_OUTOF10: 8

## 2019-04-08 NOTE — PLAN OF CARE
Problem: Fluid Volume - Imbalance:  Goal: Absence of postpartum hemorrhage signs and symptoms  Description  Absence of postpartum hemorrhage signs and symptoms  Outcome: Ongoing     Problem: Discharge Planning:  Goal: Discharged to appropriate level of care  Description  Discharged to appropriate level of care  Outcome: Ongoing     Problem: Mood - Altered:  Goal: Mood stable  Description  Mood stable  Outcome: Ongoing     Problem: Nausea/Vomiting:  Goal: Absence of nausea/vomiting  Description  Absence of nausea/vomiting  Outcome: Ongoing     Problem: Venous Thromboembolism:  Goal: Will show no signs or symptoms of venous thromboembolism  Description  Will show no signs or symptoms of venous thromboembolism  Outcome: Ongoing  Goal: Absence of signs or symptoms of impaired coagulation  Description  Absence of signs or symptoms of impaired coagulation  Outcome: Ongoing

## 2019-04-09 VITALS
WEIGHT: 177 LBS | SYSTOLIC BLOOD PRESSURE: 150 MMHG | HEART RATE: 102 BPM | HEIGHT: 63 IN | RESPIRATION RATE: 16 BRPM | OXYGEN SATURATION: 97 % | TEMPERATURE: 98.2 F | DIASTOLIC BLOOD PRESSURE: 72 MMHG | BODY MASS INDEX: 31.36 KG/M2

## 2019-04-09 PROCEDURE — 6370000000 HC RX 637 (ALT 250 FOR IP): Performed by: OBSTETRICS & GYNECOLOGY

## 2019-04-09 RX ORDER — PSEUDOEPHEDRINE HCL 30 MG
100 TABLET ORAL 2 TIMES DAILY PRN
Qty: 60 CAPSULE | Refills: 2 | Status: SHIPPED | OUTPATIENT
Start: 2019-04-09

## 2019-04-09 RX ORDER — FERROUS SULFATE 325(65) MG
325 TABLET ORAL 2 TIMES DAILY WITH MEALS
Qty: 60 TABLET | Refills: 3 | Status: SHIPPED | OUTPATIENT
Start: 2019-04-09

## 2019-04-09 RX ORDER — OXYCODONE HYDROCHLORIDE AND ACETAMINOPHEN 5; 325 MG/1; MG/1
1 TABLET ORAL EVERY 6 HOURS PRN
Qty: 20 TABLET | Refills: 0 | Status: SHIPPED | OUTPATIENT
Start: 2019-04-09 | End: 2019-04-16

## 2019-04-09 RX ORDER — IBUPROFEN 800 MG/1
800 TABLET ORAL EVERY 6 HOURS PRN
Qty: 120 TABLET | Refills: 3 | Status: SHIPPED | OUTPATIENT
Start: 2019-04-09

## 2019-04-09 RX ADMIN — Medication 1 TABLET: at 09:06

## 2019-04-09 RX ADMIN — OXYCODONE HYDROCHLORIDE AND ACETAMINOPHEN 1 TABLET: 5; 325 TABLET ORAL at 02:20

## 2019-04-09 RX ADMIN — FERROUS SULFATE TAB 325 MG (65 MG ELEMENTAL FE) 325 MG: 325 (65 FE) TAB at 09:06

## 2019-04-09 RX ADMIN — DOCUSATE SODIUM 100 MG: 100 CAPSULE, LIQUID FILLED ORAL at 09:06

## 2019-04-09 RX ADMIN — IBUPROFEN 800 MG: 800 TABLET, FILM COATED ORAL at 06:27

## 2019-04-09 RX ADMIN — OXYCODONE HYDROCHLORIDE AND ACETAMINOPHEN 2 TABLET: 5; 325 TABLET ORAL at 10:35

## 2019-04-09 RX ADMIN — OXYCODONE HYDROCHLORIDE AND ACETAMINOPHEN 1 TABLET: 5; 325 TABLET ORAL at 06:27

## 2019-04-09 RX ADMIN — IBUPROFEN 800 MG: 800 TABLET, FILM COATED ORAL at 00:02

## 2019-04-09 RX ADMIN — SIMETHICONE 80 MG: 80 TABLET, CHEWABLE ORAL at 00:02

## 2019-04-09 ASSESSMENT — PAIN SCALES - GENERAL
PAINLEVEL_OUTOF10: 8
PAINLEVEL_OUTOF10: 6
PAINLEVEL_OUTOF10: 8
PAINLEVEL_OUTOF10: 7

## 2019-04-09 NOTE — LACTATION NOTE
This note was copied from a baby's chart. Lactation Progress Note      Data:     F/u on primip breast feeder during lactation rounds, 2po. Pt requesting LC show her how to use her pump for home, stating she would like to begin pumping to ensure she has a good milk supply. Baby is swaddled in crib, lying prone. Pacifier noted at bedside. Action: Education provided on safe sleep and encouraged A, B, C's of sleep so that baby is alone, on back, in crib. Pt states she thought baby's could sleep on their sides and that her baby will not suffocate because she is always awake and watching baby. Reinforced safe sleep education, and importance to practice safe sleep habits at all times ensuring that baby is positioned on his back, alone, in crib. [facility] repositioned baby to his back in the crib. Infant remains asleep, breathing comfortably, and pink. Reviewed use of breast pump for home. Encouraging exclusive breast feeding, without pacifiers, formula supplements, or pumping educating on risks to breast feeding relationship, and milk supply imbalances. Pt states she was told she would need to begin pumping as soon as possible and frequently to ensure she produces enough for baby. Pt concerned that baby isn't getting enough at the breast, and very concern about milk supply and asking what foods she can eat to boost supply. Explained process of lactogenesis, benefits of exclusive breast feeding, and ability to meet baby's nutritional needs, size of infant's belly, encouraging exclusive breast feeding offering the breast to baby often and when baby first begins to show signs of hunger. Encouraged well balanced high fiber, high protein diet, staying hydrated and drinking to thirst. Encouraged much STS with baby, hand expression and breast massage to increase stimulation. Reassured how to know baby is getting enough at the breast including weight trends and output. Breast feeding education reviewed.  Encouraged to wait a minimum of 2 weeks before pumping, but ideally 4-6 weeks, unless medically indicated, explaining rationale. Name and number on whiteboard. Encouraged to call for [facility] to assess latch and for f/u support prn. Response: Verbalized understanding of teaching provided. Will call for f/u prn.

## 2019-04-09 NOTE — DISCHARGE SUMMARY
ID bands checked. Infant's ID band and Mother's matching ID bands removed and taped to discharge instruction sheet, the mother verified as correct and witnessed by RN. HUGS tag removed. Mom and  Infant discharged via wheelchair to private car. Infant placed in car seat per parents. Mom and baby accompanied by family and in stable condition.

## 2019-04-09 NOTE — PROGRESS NOTES
Subjective:     Postpartum Day 3:  Delivery    The patient feels well. The patient denies emotional concerns. Pain is moderately controlled with current medications. The baby iswell. Baby is feeding via breast. Urinary output is adequate. The patient is ambulating well. The patient is tolerating a normal diet. Flatus has been passed. Objective:    VITALS:  BP (!) 123/58   Pulse 91   Temp 97.7 °F (36.5 °C) (Oral)   Resp 16   Ht 5' 3\" (1.6 m)   Wt 177 lb (80.3 kg)   LMP  (Exact Date)   SpO2 97%   Breastfeeding? Unknown   BMI 31.35 kg/m²     Vitals:    19 0217   BP: (!) 123/58   Pulse: 91   Resp: 16   Temp: 97.7 °F (36.5 °C)   SpO2:          General:    alert, appears stated age and cooperative   Bowel Sounds:  active   Lochia:  appropriate   Uterine Fundus:   firm   Incision:  healing well, no significant drainage, no dehiscence, no significant erythema   DVT Evaluation:  No evidence of DVT seen on physical exam.     CBC   Lab Results   Component Value Date    WBC 18.6 (H) 2019    HGB 8.2 (L) 2019    HCT 24.3 (L) 2019    MCV 96.8 2019     2019        Assessment:     POD # 3   section. Doing well postoperatively. anemia    Plan:     Discharge home with standard precautions and return to clinic in 4-6 weeks.

## 2019-04-09 NOTE — PLAN OF CARE
Problem: Discharge Planning:  Goal: Discharged to appropriate level of care  Description  Discharged to appropriate level of care  4/9/2019 0724 by Tim Bates RN  Outcome: Completed  4/9/2019 0301 by Selin Nelson RN  Outcome: Ongoing     Problem: Mood - Altered:  Goal: Mood stable  Description  Mood stable  4/9/2019 0724 by Tim Bates RN  Outcome: Completed  4/9/2019 0301 by Selin Nelson RN  Outcome: Ongoing     Problem: Nausea/Vomiting:  Goal: Absence of nausea/vomiting  Description  Absence of nausea/vomiting  4/9/2019 0724 by Tim Bates RN  Outcome: Completed  4/9/2019 0301 by Selin Nelson RN  Outcome: Ongoing

## 2019-04-09 NOTE — DISCHARGE SUMMARY
Obstetrical Discharge Form    Gestational Age: 39w6d    Antepartum complications: prolonged rupture of membranes    Date of Delivery: 19    Type of Delivery:  for cephalopelvic disproportion    Delivered By: Melvin Dahlat     Baby:      Information for the patient's :  Iker Contreras [[id number]]   APGAR One: 8    Information for the patient's :  Iker Contreras [[id number]]   APGAR Five: 9    Information for the patient's :  Iker Contreras [[id number]]   Birth Weight: 7 lb 14.1 oz (3.575 kg)      Anesthesia: Epidural    Intrapartum complications: None    Postpartum complications: anemia    Discharge Medication:    Laurie Chahal   [place] Medication Instructions [id number]    Printed on:19 5180   Medication Information                      docusate sodium (COLACE, DULCOLAX) 100 MG CAPS  Take 100 mg by mouth 2 times daily as needed for Constipation             ferrous sulfate 325 (65 Fe) MG tablet  Take 1 tablet by mouth 2 times daily (with meals)             ibuprofen (ADVIL;MOTRIN) 800 MG tablet  Take 1 tablet by mouth every 6 hours as needed (Pain level 1 - 10)             oxyCODONE-acetaminophen (PERCOCET) 5-325 MG per tablet  Take 1 tablet by mouth every 6 hours as needed for Pain for up to 7 days. Discharge Condition:  stable    Discharge Date: 19    PLAN:  Follow up in 6 weeks for routine PP visit  All questions answered  D/C summary begun at delivery for D/C planning purposes, any delay in discharge from ordered D/C date due to  factors.

## 2019-04-09 NOTE — FLOWSHEET NOTE
Discharge Phone Call Log  Patient Name: Yong Burkett     [facility] Care Provider: Richard Green MD Discharge Date: 2019    Disposition of baby:    Phone Number: [phone] (home)     Attempts to Contact:  Date:    Nurse  Date:    Nurse  Date:    Nurse    Introduce yourself and explain the questionnaire. 1.  Now that you are at home is your pain being well controlled? Y/N   What pain reducing measures are you using? ____________________________________        Information for the patient's :  Fadumo Mort [[id number]]   Delivery Method: , Low Transverse    2. Are you having any infant feeding issues? Y/N _____________________________      If breastfeeding, were you satisfied with the breastfeeding support services offered? Y/N  3. Any engorgement problems? Y/N  Have you had to supplement? Y/N  4. Have you made or have you already had your first appointment with the baby's doctor? Y/N If no, do you know when to schedule it? Y/N Do you have a safe crib, bassinet or play yard with a firm mattress for your infant to sleep in at home? Y/N  5. Have you scheduled your follow-up appointment? Y/N  If no, do you know when to schedule it? Y/N  6. Did your nurses and physicians include you in the plan of care, communicating with      you respectfully, and in a manner easy to understand? Y/N       Comments:  ___________________________________________________________  7. Is there anyone in particular you would like to mention who provided care for you?          ____________________________    8. Do you have any questions about your discharge instructions or the notebook? Y/N    9. Did your discharge occur in a timely manner? Y/N        Comments: __________________________________________________________    Remember to describe the hospital survey and ask patient to return it when possible.   Questions During Interview:__________________________________________________________  Teaching During interview :___________________________________________________________  ___________________________RN       Date:______________Time:________________

## 2024-03-25 ENCOUNTER — OFFICE VISIT (OUTPATIENT)
Dept: URGENT CARE | Age: 39
End: 2024-03-25

## 2024-03-25 VITALS
HEART RATE: 84 BPM | WEIGHT: 141 LBS | BODY MASS INDEX: 24.98 KG/M2 | OXYGEN SATURATION: 99 % | TEMPERATURE: 98.7 F | SYSTOLIC BLOOD PRESSURE: 135 MMHG | DIASTOLIC BLOOD PRESSURE: 83 MMHG

## 2024-03-25 DIAGNOSIS — Z02.0 SCHOOL PHYSICAL EXAM: Primary | ICD-10-CM

## 2024-03-25 NOTE — PROGRESS NOTES
Rochelle Sosa (:  1985) is a 38 y.o. female,New patient, here for evaluation of the following chief complaint(s):  School/Camp Physical (Physical for clinicals)      ASSESSMENT/PLAN:    ICD-10-CM    1. School physical exam  Z02.0         Cleared to attend school  See scanned in forms  Return as needed       SUBJECTIVE/OBJECTIVE:  Needs physical to attend school  Has immunization records/Tb test result with her   No complaint      History provided by:  Patient      Vitals:    24 1655   BP: 135/83   Site: Right Upper Arm   Position: Sitting   Cuff Size: Medium Adult   Pulse: 84   Temp: 98.7 °F (37.1 °C)   TempSrc: Oral   SpO2: 99%   Weight: 64 kg (141 lb)       Review of Systems   Constitutional: Negative.    All other systems reviewed and are negative.      Physical Exam    Physical  Vitals signs: reviewed  Constitutional:  appearance: well nourished ..  does not appear acutely ill     Eyes:                 Pupil: equal-round-reactive to light, no photophobia, EOMI            Cornea: clear            Sclera: clear, non injected, non icteric    Ears: Right canal clear / TM normal           Left ear canal clear / TM normal  Nose/Sinus:  no nasal congestion/no drainage   Mouth:                 Mucous membranes moist               Oropharynx:  clear, no erythema, no exudates, voice normal  Neck:    supple, non tender,               No cervical lymph nodes   Pulmonary/Lungs:  effort normal, no stridor                                 Auscultation: good air movement / breath sounds normal  Cardio-vascular:  normal rate and rhythm                              Heart sounds normal-no murmur- no rub   Abdomen:    soft .. Non tender,  no organomegaly..no Right CVA tenderness / No Left CVA tenderness  Muscular skeleton:  motor strength normal / muscle tone normal                                  Extremities/joints: no tenderness, normal movements                                  Neck-back-spine:

## (undated) DEVICE — 3M™ STERI-STRIP™ COMPOUND BENZOIN TINCTURE 40 BAGS/CARTON 4 CARTONS/CASE C1544: Brand: 3M™ STERI-STRIP™

## (undated) DEVICE — PAD,NON-ADHERENT,3X8,STERILE,LF,1/PK: Brand: MEDLINE

## (undated) DEVICE — DRESSING COMP IS W4XL10IN PD W2XL8IN CNTCT LAYR ADH

## (undated) DEVICE — SUTURE ABSORBABLE BRAIDED 2-0 CT-1 27 IN UD VICRYL J259H

## (undated) DEVICE — TRAY URIN CATH 16FR DRNGE BG STATLOK STBL DEV F SURSTP

## (undated) DEVICE — Device

## (undated) DEVICE — SUTURE COAT VCRL SZ 4-0 L18IN ABSRB UD L19MM PS-2 1/2 CIR J496G

## (undated) DEVICE — GARMENT COMPR L FOR 23IN CALF FLOTRN

## (undated) DEVICE — GLOVE SURG SZ 65 THK91MIL LTX FREE SYN POLYISOPRENE

## (undated) DEVICE — S/USE RESUS KIT W/O MASK (10): Brand: FISHER & PAYKEL HEALTHCARE

## (undated) DEVICE — BLADE CLIPPER GEN PURP NS

## (undated) DEVICE — CHLORAPREP 26ML ORANGE

## (undated) DEVICE — SUTURE VCRL SZ 3-0 L36IN ABSRB UD L36MM CT-1 1/2 CIR J944H

## (undated) DEVICE — SUTURE VCRL SZ 0 L36IN ABSRB UD L36MM CT-1 1/2 CIR J946H